# Patient Record
Sex: FEMALE | URBAN - METROPOLITAN AREA
[De-identification: names, ages, dates, MRNs, and addresses within clinical notes are randomized per-mention and may not be internally consistent; named-entity substitution may affect disease eponyms.]

---

## 2022-12-09 ENCOUNTER — APPOINTMENT (OUTPATIENT)
Dept: URBAN - METROPOLITAN AREA CLINIC 265 | Age: 27
Setting detail: DERMATOLOGY
End: 2022-12-09

## 2022-12-09 DIAGNOSIS — Z41.9 ENCOUNTER FOR PROCEDURE FOR PURPOSES OTHER THAN REMEDYING HEALTH STATE, UNSPECIFIED: ICD-10-CM

## 2022-12-09 PROCEDURE — OTHER ADDITIONAL NOTES: OTHER

## 2022-12-09 NOTE — PROCEDURE: ADDITIONAL NOTES
Additional Notes: Discussed chemical peels and pre treatment with retinol. Recommended JM cesta eye cream for dark under eyes.
Detail Level: Simple
Render Risk Assessment In Note?: no